# Patient Record
Sex: MALE | Race: WHITE | Employment: UNEMPLOYED | URBAN - METROPOLITAN AREA
[De-identification: names, ages, dates, MRNs, and addresses within clinical notes are randomized per-mention and may not be internally consistent; named-entity substitution may affect disease eponyms.]

---

## 2018-05-29 ENCOUNTER — HOSPITAL ENCOUNTER (EMERGENCY)
Age: 48
Discharge: HOME | End: 2018-05-29
Attending: EMERGENCY MEDICINE
Payer: MEDICAID

## 2018-05-29 VITALS
HEART RATE: 95 BPM | SYSTOLIC BLOOD PRESSURE: 148 MMHG | DIASTOLIC BLOOD PRESSURE: 95 MMHG | BODY MASS INDEX: 26.52 KG/M2 | WEIGHT: 165 LBS | TEMPERATURE: 98 F | HEIGHT: 66 IN | RESPIRATION RATE: 16 BRPM | OXYGEN SATURATION: 99 %

## 2018-05-29 DIAGNOSIS — K12.0 APHTHOUS ULCER OF MOUTH: Primary | ICD-10-CM

## 2018-05-29 DIAGNOSIS — R03.0 ELEVATED BLOOD PRESSURE READING: ICD-10-CM

## 2018-05-29 PROCEDURE — 99282 EMERGENCY DEPT VISIT SF MDM: CPT

## 2018-05-29 RX ORDER — TRIAMCINOLONE ACETONIDE 1 MG/G
PASTE DENTAL 2 TIMES DAILY
Qty: 1 TUBE | Refills: 0 | Status: ON HOLD | OUTPATIENT
Start: 2018-05-29 | End: 2019-06-26

## 2018-05-29 RX ORDER — IBUPROFEN 800 MG/1
800 TABLET ORAL
Qty: 20 TAB | Refills: 0 | Status: SHIPPED | OUTPATIENT
Start: 2018-05-29 | End: 2018-06-05
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
## 2018-05-29 NOTE — DISCHARGE INSTRUCTIONS
Elevated Blood Pressure: Care Instructions  Your Care Instructions    Blood pressure is a measure of how hard the blood pushes against the walls of your arteries. It's normal for blood pressure to go up and down throughout the day. But if it stays up over time, you have high blood pressure. Two numbers tell you your blood pressure. The first number is the systolic pressure. It shows how hard the blood pushes when your heart is pumping. The second number is the diastolic pressure. It shows how hard the blood pushes between heartbeats, when your heart is relaxed and filling with blood. An ideal blood pressure in adults is less than 120/80 (say \"120 over 80\"). High blood pressure is 140/90 or higher. You have high blood pressure if your top number is 140 or higher or your bottom number is 90 or higher, or both. The main test for high blood pressure is simple, fast, and painless. To diagnose high blood pressure, your doctor will test your blood pressure at different times. After testing your blood pressure, your doctor may ask you to test it again when you are home. If you are diagnosed with high blood pressure, you can work with your doctor to make a long-term plan to manage it. Follow-up care is a key part of your treatment and safety. Be sure to make and go to all appointments, and call your doctor if you are having problems. It's also a good idea to know your test results and keep a list of the medicines you take. How can you care for yourself at home? · Do not smoke. Smoking increases your risk for heart attack and stroke. If you need help quitting, talk to your doctor about stop-smoking programs and medicines. These can increase your chances of quitting for good. · Stay at a healthy weight. · Try to limit how much sodium you eat to less than 2,300 milligrams (mg) a day. Your doctor may ask you to try to eat less than 1,500 mg a day. · Be physically active.  Get at least 30 minutes of exercise on most days of the week. Walking is a good choice. You also may want to do other activities, such as running, swimming, cycling, or playing tennis or team sports. · Avoid or limit alcohol. Talk to your doctor about whether you can drink any alcohol. · Eat plenty of fruits, vegetables, and low-fat dairy products. Eat less saturated and total fats. · Learn how to check your blood pressure at home. When should you call for help? Call your doctor now or seek immediate medical care if:  ? · Your blood pressure is much higher than normal (such as 180/110 or higher). ? · You think high blood pressure is causing symptoms such as:  ¨ Severe headache. ¨ Blurry vision. ? Watch closely for changes in your health, and be sure to contact your doctor if:  ? · You do not get better as expected. Where can you learn more? Go to http://natashaInfaCare Pharmaceuticalviviana.info/. Enter L471 in the search box to learn more about \"Elevated Blood Pressure: Care Instructions. \"  Current as of: September 21, 2016  Content Version: 11.4  © 1688-3484 [employer]. Care instructions adapted under license by [employer] (which disclaims liability or warranty for this information). If you have questions about a medical condition or this instruction, always ask your healthcare professional. [address] any warranty or liability for your use of this information. [employer] Activation    Thank you for requesting access to [employer]. Please follow the instructions below to securely access and download your online medical record. [employer] allows you to send messages to your doctor, view your test results, renew your prescriptions, schedule appointments, and more. How Do I Sign Up? 1. In your internet browser, go to [email or web address]  2. Click on the First Time User? Click Here link in the Sign In box. You will be redirect to the New Member Sign Up page.   3. Enter your [employer] Access Code exactly as it appears below. You will not need to use this code after youve completed the sign-up process. If you do not sign up before the expiration date, you must request a new code. [employer] Access Code: [email or web address]  Expires: 2018 12:47 PM (This is the date your [employer] access code will )    4. Enter the last four digits of your Social Security Number (xxxx) and Date of Birth (mm/dd/yyyy) as indicated and click Submit. You will be taken to the next sign-up page. 5. Create a [employer] ID. This will be your [employer] login ID and cannot be changed, so think of one that is secure and easy to remember. 6. Create a [employer] password. You can change your password at any time. 7. Enter your Password Reset Question and Answer. This can be used at a later time if you forget your password. 8. Enter your e-mail address. You will receive e-mail notification when new information is available in [address]. 9. Click Sign Up. You can now view and download portions of your medical record. 10. Click the Download Summary menu link to download a portable copy of your medical information. Additional Information    If you have questions, please visit the Frequently Asked Questions section of the [employer] website at [email or web address] Remember, [employer] is NOT to be used for urgent needs. For medical emergencies, dial 911. Use the Kenalog twice daily on the tongue lesion. Follow up with your primary care provide for further evaluation and treatment.

## 2019-06-19 ENCOUNTER — APPOINTMENT (OUTPATIENT)
Dept: CT IMAGING | Age: 49
End: 2019-06-19
Attending: PHYSICIAN ASSISTANT
Payer: MEDICAID

## 2019-06-19 ENCOUNTER — HOSPITAL ENCOUNTER (EMERGENCY)
Age: 49
Discharge: HOME | End: 2019-06-19
Attending: EMERGENCY MEDICINE
Payer: MEDICAID

## 2019-06-19 VITALS
BODY MASS INDEX: 27.35 KG/M2 | HEART RATE: 84 BPM | TEMPERATURE: 98 F | SYSTOLIC BLOOD PRESSURE: 149 MMHG | WEIGHT: 191 LBS | RESPIRATION RATE: 16 BRPM | HEIGHT: 70 IN | OXYGEN SATURATION: 98 % | DIASTOLIC BLOOD PRESSURE: 87 MMHG

## 2019-06-19 DIAGNOSIS — S01.81XA FACIAL LACERATION, INITIAL ENCOUNTER: ICD-10-CM
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
## 2019-06-19 DIAGNOSIS — S00.83XA FACIAL HEMATOMA, INITIAL ENCOUNTER: ICD-10-CM

## 2019-06-19 PROCEDURE — 77030031132 HC SUT NYL COVD -A

## 2019-06-19 PROCEDURE — 99282 EMERGENCY DEPT VISIT SF MDM: CPT

## 2019-06-19 PROCEDURE — 75810000293 HC SIMP/SUPERF WND  RPR

## 2019-06-19 PROCEDURE — 74011250636 HC RX REV CODE- 250/636: Performed by: PHYSICIAN ASSISTANT

## 2019-06-19 PROCEDURE — 70450 CT HEAD/BRAIN W/O DYE: CPT

## 2019-06-19 PROCEDURE — 90715 TDAP VACCINE 7 YRS/> IM: CPT | Performed by: PHYSICIAN ASSISTANT

## 2019-06-19 PROCEDURE — 90471 IMMUNIZATION ADMIN: CPT

## 2019-06-19 PROCEDURE — 70486 CT MAXILLOFACIAL W/O DYE: CPT

## 2019-06-19 RX ORDER — OXYCODONE AND ACETAMINOPHEN 5; 325 MG/1; MG/1
TABLET ORAL
COMMUNITY
End: 2019-06-19

## 2019-06-19 RX ORDER — HYDROCODONE BITARTRATE AND ACETAMINOPHEN 5; 325 MG/1; MG/1
1 TABLET ORAL
Qty: 12 TAB | Refills: 0 | Status: SHIPPED | OUTPATIENT
Start: 2019-06-19 | End: 2019-06-22

## 2019-06-19 RX ORDER — IBUPROFEN 200 MG
TABLET ORAL
Status: ON HOLD | COMMUNITY
End: 2019-06-26 | Stop reason: DRUGHIGH

## 2019-06-19 RX ORDER — QUETIAPINE FUMARATE 25 MG/1
TABLET, FILM COATED ORAL
Status: ON HOLD | COMMUNITY
End: 2019-06-26 | Stop reason: DRUGHIGH

## 2019-06-19 RX ADMIN — TETANUS TOXOID, REDUCED DIPHTHERIA TOXOID AND ACELLULAR PERTUSSIS VACCINE, ADSORBED 0.5 ML: 5; 2.5; 8; 8; 2.5 SUSPENSION INTRAMUSCULAR at 17:32

## 2019-06-19 NOTE — DISCHARGE INSTRUCTIONS
Patient Education        Preventing Falls: Care Instructions  Your Care Instructions    Getting around your home safely can be a challenge if you have injuries or health problems that make it easy for you to fall. Loose rugs and furniture in walkways are among the dangers for many older people who have problems walking or who have poor eyesight. People who have conditions such as arthritis, osteoporosis, or dementia also have to be careful not to fall. You can make your home safer with a few simple measures. Follow-up care is a key part of your treatment and safety. Be sure to make and go to all appointments, and call your doctor if you are having problems. It's also a good idea to know your test results and keep a list of the medicines you take. How can you care for yourself at home? Taking care of yourself  · You may get dizzy if you do not drink enough water. To prevent dehydration, drink plenty of fluids, enough so that your urine is light yellow or clear like water. Choose water and other caffeine-free clear liquids. If you have kidney, heart, or liver disease and have to limit fluids, talk with your doctor before you increase the amount of fluids you drink. · Exercise regularly to improve your strength, muscle tone, and balance. Walk if you can. Swimming may be a good choice if you cannot walk easily. · Have your vision and hearing checked each year or any time you notice a change. If you have trouble seeing and hearing, you might not be able to avoid objects and could lose your balance. · Know the side effects of the medicines you take. Ask your doctor or pharmacist whether the medicines you take can affect your balance. Sleeping pills or sedatives can affect your balance. · Limit the amount of alcohol you drink. Alcohol can impair your balance and other senses. · Ask your doctor whether calluses or corns on your feet need to be removed.  If you wear loose-fitting shoes because of calluses or corns, you can lose your balance and fall. · Talk to your doctor if you have numbness in your feet. Preventing falls at home  · Remove raised doorway thresholds, throw rugs, and clutter. Repair loose carpet or raised areas in the floor. · Move furniture and electrical cords to keep them out of walking paths. · Use nonskid floor wax, and wipe up spills right away, especially on ceramic tile floors. · If you use a walker or cane, put rubber tips on it. If you use crutches, clean the bottoms of them regularly with an abrasive pad, such as steel wool. · Keep your house well lit, especially [facility], and outside walkways. Use night-lights in areas such as hallways and bathrooms. Add extra light switches or use remote switches (such as switches that go on or off when you clap your hands) to make it easier to turn lights on if you have to get up during the night. · Install sturdy handrails on stairways. · Move items in your cabinets so that the things you use a lot are on the lower shelves (about waist level). · Keep a cordless phone and a flashlight with new batteries by your bed. If possible, put a phone in each of the main rooms of your house, or carry a cell phone in case you fall and cannot reach a phone. Or, you can wear a device around your neck or wrist. You push a button that sends a signal for help. · Wear low-heeled shoes that fit well and give your feet good support. Use footwear with nonskid soles. Check the heels and soles of your shoes for wear. Repair or replace worn heels or soles. · Do not wear socks without shoes on wood floors. · Walk on the grass when the sidewalks are slippery. If you live in an area that gets snow and ice in the winter, sprinkle salt on slippery steps and sidewalks. Preventing falls in the bath  · Install grab bars and nonskid mats inside and outside your shower or tub and near the toilet and sinks. · Use shower chairs and bath benches.   · Use a hand-held shower head that will allow you to sit while showering. · Get into a tub or shower by putting the weaker leg in first. Get out of a tub or shower with your strong side first.  · Repair loose toilet seats and consider installing a raised toilet seat to make getting on and off the toilet easier. · Keep your bathroom door unlocked while you are in the shower. Where can you learn more? Go to http://natasha-viviana.info/. Enter 0476 [id number] in the search box to learn more about \"Preventing Falls: Care Instructions. \"  Current as of: 2018  Content Version: 11.8  © 2532-0400 [employer]. Care instructions adapted under license by [employer] (which disclaims liability or warranty for this information). If you have questions about a medical condition or this instruction, always ask your healthcare professional. [place] [address] any warranty or liability for your use of this information. Fiix Activation    Thank you for requesting access to [employer]. Please follow the instructions below to securely access and download your online medical record. [employer] allows you to send messages to your doctor, view your test results, renew your prescriptions, schedule appointments, and more. How Do I Sign Up? 1. In your internet browser, go to [email or web address]  2. Click on the First Time User? Click Here link in the Sign In box. You will be redirect to the New Member Sign Up page. 3. Enter your Fiix Access Code exactly as it appears below. You will not need to use this code after youve completed the sign-up process. If you do not sign up before the expiration date, you must request a new code. Fiix Access Code: [email or web address]  Expires: 8/3/2019  4:51 PM (This is the date your [employer] access code will )    4. Enter the last four digits of your Social Security Number (xxxx) and Date of Birth (mm/dd/yyyy) as indicated and click Submit.  You will be taken to the next sign-up page. 5. Create a S[employer] ID. This will be your [employer] login ID and cannot be changed, so think of one that is secure and easy to remember. 6. Create a [employer] password. You can change your password at any time. 7. Enter your Password Reset Question and Answer. This can be used at a later time if you forget your password. 8. Enter your e-mail address. You will receive e-mail notification when new information is available in [address]. 9. Click Sign Up. You can now view and download portions of your medical record. 10. Click the Download Summary menu link to download a portable copy of your medical information. Additional Information    If you have questions, please visit the Frequently Asked Questions section of the [employer] website at [email or web address] Remember, [employer] is NOT to be used for urgent needs. For medical emergencies, dial 911. Follow-up with primary care doctor in 1 week. Return to the ED immediately for any new or worsening symptoms.

## 2019-06-19 NOTE — ED TRIAGE NOTES
Inga Anglican in bathroom this morning with face hitting soap dish. Lac above left eye.  Visual aquity 20/20

## 2019-06-19 NOTE — ED PROVIDER NOTES
EMERGENCY DEPARTMENT HISTORY AND PHYSICAL EXAM    Date: 6/19/2019  Patient Name: Leslie Morris [facility] PADMINI RODRIGUEZ    History of Presenting Illness     Chief Complaint   Patient presents with    Laceration    Fall         History Provided By:patient     Chief Complaint: fall  Duration: this morning  Timing:  acute  Location: above the L eye and eyebrow  Quality: aching  Severity: moderate  Modifying Factors: none  Associated Symptoms: none      Additional History (Context): Mariana Fleischer is a 47 y.o. male with PMH htn and arthritis who presents with c/o a laceration above the L eye after a fall this morning. Pt states he slipped in the bathroom striking his forehead on the soap dispenser of the tub. Denies LOC and neck pain. Unsure of his last tetanus. No other complaints. PCP: Phil Sloan MD    Current Outpatient Medications   Medication Sig Dispense Refill    QUEtiapine (SEROQUEL) 25 mg tablet Take  by mouth.  ibuprofen (MOTRIN IB) 200 mg tablet Take  by mouth.  OTHER Indications: bp med      oxyCODONE-acetaminophen (PERCOCET) 5-325 mg per tablet Take  by mouth every four (4) hours as needed for Pain.  triamcinolone acetonide (KENALOG) 0.1 % dental paste by Dental route two (2) times a day. 1 Tube 0       Past History     Past Medical History:  Past Medical History:   Diagnosis Date    Arthritis     Hypertension     Musculoskeletal disorder        Past Surgical History:  Past Surgical History:   Procedure Laterality Date    HX ORTHOPAEDIC         Family History:  History reviewed. No pertinent family history. Social History:  Social History     Tobacco Use    Smoking status: Never Smoker    Smokeless tobacco: Never Used   Substance Use Topics    Alcohol use: Yes     Alcohol/week: 6.0 oz     Types: 12 Cans of beer per week    Drug use: No       Allergies:  No Known Allergies      Review of Systems   Review of Systems   Constitutional: Negative.   Negative for chills and fever. HENT: Negative. Negative for congestion, ear pain and rhinorrhea. Eyes: Negative. Negative for pain and redness. Respiratory: Negative. Negative for cough, shortness of breath, wheezing and stridor. Cardiovascular: Negative. Negative for chest pain and leg swelling. Gastrointestinal: Negative. Negative for abdominal pain, constipation, diarrhea, nausea and vomiting. Genitourinary: Negative. Negative for dysuria and frequency. Musculoskeletal: Negative. Negative for back pain and neck pain. Skin: Positive for wound. Negative for rash. Neurological: Negative. Negative for dizziness, seizures, syncope and headaches. All other systems reviewed and are negative. All Other Systems Negative  Physical Exam     Vitals:    06/19/19 1655   BP: 149/87   Pulse: 84   Resp: 16   Temp: 98 °F (36.7 °C)   SpO2: 98%   Weight: 86.6 kg (191 lb)   Height: 5' 10\" (1.778 m)     Physical Exam   Constitutional: He is oriented to person, place, and time. He appears well-developed and well-nourished. No distress. HENT:   Head: Normocephalic and atraumatic. Eyes: Pupils are equal, round, and reactive to light. Conjunctivae and EOM are normal. Right eye exhibits no discharge. Left eye exhibits no discharge. No scleral icterus. Neck: Normal range of motion. Neck supple. No midline TTP noted to the posterior cervical spine, ROM intact, no evidence of radiculopathy noted. Cardiovascular: Normal rate, regular rhythm and normal heart sounds. Exam reveals no gallop and no friction rub. No murmur heard. Pulmonary/Chest: Effort normal and breath sounds normal. No stridor. No respiratory distress. He has no wheezes. He has no rales. Musculoskeletal: Normal range of motion. Neurological: He is alert and oriented to person, place, and time. Coordination normal.   Gait is steady. Able to ambulate without difficulty. Skin: Skin is warm and dry. No rash noted. He is not diaphoretic. Large 4 cm laceration noted across the L eyebrow and inferior orbital region, wound extends into the adipose tissue, no retained FB noted, bleeding controlled. Psychiatric: He has a normal mood and affect. His behavior is normal. Thought content normal.   Nursing note and vitals reviewed. Diagnostic Study Results     Labs -   No results found for this or any previous visit (from the past 12 hour(s)). Radiologic Studies -   CT MAXILLOFACIAL WO CONT   Final Result   Impression:      Left-sided frontal/supraorbital soft tissue swelling/hematoma. No definite   intraorbital or intraocular abnormality noted. No definite facial bone or skull   fracture. CT HEAD WO CONT   Final Result   IMPRESSION:      Left frontal/supraorbital scalp edema/hematoma with laceration. No adjacent   skull orbital/fracture seen. No acute intracranial hemorrhage, mass effect, midline shift, or herniation. No   definite CT evidence of acute cortical infarct is seen. Please note that   noncontrast head CT may be normal in early acute infarct. CT Results  (Last 48 hours)               06/19/19 1726  CT MAXILLOFACIAL WO CONT Final result    Impression:  Impression:       Left-sided frontal/supraorbital soft tissue swelling/hematoma. No definite   intraorbital or intraocular abnormality noted. No definite facial bone or skull   fracture. Narrative:  History: Henry Alfredo in bathroom with left periorbital laceration       Technique:       Volumetric acquisition was obtained from the bottom of the mandible through the   top of the orbits. Coronal reconstructions were obtained to better detect the   presence of any transverse fractures. One or more dose reduction techniques were used on this CT: automated exposure   control, adjustment of the mAs and/or kVp according to patient's size, and   iterative reconstruction techniques.  The specific techniques utilized on this CT   exam have been documented in the patient's electronic medical record. Digital Imaging and Communications in Medicine (DICOM) format image data are   available to nonaffiliated external healthcare facilities or entities on a   secure, media free, reciprocally searchable basis with patient authorization for   at least a 12-month period after this study. Findings:       Facial bones:  No evidence of an acute fracture or dislocation is identified. Orbits: There is soft tissue/scalp swelling/hematoma involving the left frontal   and supraorbital region. No intraorbital or intraocular abnormality seen on   either side. Soft tissues: Left-sided soft tissue swelling as described. Retention cyst or polyp seen in the left maxillary antrum floor. 06/19/19 1726  CT HEAD WO CONT Final result    Impression:  IMPRESSION:       Left frontal/supraorbital scalp edema/hematoma with laceration. No adjacent   skull orbital/fracture seen. No acute intracranial hemorrhage, mass effect, midline shift, or herniation. No   definite CT evidence of acute cortical infarct is seen. Please note that   noncontrast head CT may be normal in early acute infarct. Narrative:  EXAM: CT head       INDICATION: Fall with injury, laceration above left eye. COMPARISON: None. TECHNIQUE: Axial CT imaging of the head was performed without intravenous   contrast. Coronal and sagittal reconstructions were obtained. Dose reduction: One or more dose reduction techniques were used on this CT:   automated exposure control, adjustment of the mAs and/or kVp according to   patient's size, and iterative reconstruction techniques. The specific techniques   utilized on this CT exam have been documented in the patient's electronic   medical record.   _______________       FINDINGS:       Study limited by motion artifacts.        BRAIN PARENCHYMA: There is no evidence of acute intracranial hemorrhage, mass   effect, midline shift, or herniation. No definite CT evidence of acute cortical   infarct is seen. The gray-white matter differentiation is within normal limits. Mild atrophy. VENTRICLES/EXTRA-AXIAL SPACES/MENINGES: The ventricles and sulci are normal in   their size and configuration. OSSEOUS STRUCTURES: No fracture is seen. PARANASAL SINUSES/MASTOIDS: Visualized paranasal sinuses and mastoid air cells   are clear. ORBITS: Laceration noted with soft tissue swelling left frontal scalp and   supraorbital region. OTHER: None.         _______________               CXR Results  (Last 48 hours)    None            Medical Decision Making   I am the first provider for this patient. I reviewed the vital signs, available nursing notes, past medical history, past surgical history, family history and social history. Vital Signs-Reviewed the patient's vital signs. Records Reviewed: Tomasa Liu PA-C     Procedures:  Wound Repair  Date/Time: 6/19/2019 6:55 PM  Performed by: PA and studentPreparation: skin prepped with Betadine and sterile field established  Pre-procedure re-eval: Immediately prior to the procedure, the patient was reevaluated and found suitable for the planned procedure and any planned medications. Time out: Immediately prior to the procedure a time out was called to verify the correct patient, procedure, equipment, staff and marking as appropriate. .  Location details: face  Wound length:2.6 - 7.5 cm    Anesthesia:  Local Anesthetic: lidocaine 1% without epinephrine  Anesthetic total: 4 mL  Wound fascia closure material used: 3-0 nylon. Number of sutures: 9  Technique: simple and interrupted  Approximation: close  Patient tolerance: Patient tolerated the procedure well with no immediate complications  My total time at bedside, performing this procedure was 16-30 minutes. Provider Notes (Medical Decision Making):  Impression:  Fall, laceration, hematoma    Wound sutured, tetanus updated, CT scans negative, will d/c with PCP follow-up. Pt agrees. Tomasa Liu PA-C 6:57 PM     MED RECONCILIATION:  No current facility-administered medications for this encounter. Current Outpatient Medications   Medication Sig    QUEtiapine (SEROQUEL) 25 mg tablet Take  by mouth.  ibuprofen (MOTRIN IB) 200 mg tablet Take  by mouth.  OTHER Indications: bp med    oxyCODONE-acetaminophen (PERCOCET) 5-325 mg per tablet Take  by mouth every four (4) hours as needed for Pain.  triamcinolone acetonide (KENALOG) 0.1 % dental paste by Dental route two (2) times a day. Disposition:  D/c    DISCHARGE NOTE:   Patient is stable for discharge at this time. No new rx given. Rest and follow-up with PCP this week. Return to the ED immediately for any new or worsening sx. Tomasa Chan PA-C 6:57 PM     Follow-up Information     Follow up With Specialties Details Why Contact Info    Mpoy Lebron Holstein, MD Internal Medicine Schedule an appointment as soon as possible for a visit in 1 week For suture removal [address] [place] [address] EMERGENCY DEPT Emergency Medicine  As needed, If symptoms worsen [address] [place] [address]  [phone]            Diagnosis     Clinical Impression:   1. Fall, initial encounter    2. Facial laceration, initial encounter    3.  Facial hematoma, initial encounter

## 2019-06-26 ENCOUNTER — HOSPITAL ENCOUNTER (OUTPATIENT)
Age: 49
Setting detail: OBSERVATION
Discharge: HOME | DRG: 460 | End: 2019-06-27
Attending: EMERGENCY MEDICINE | Admitting: HOSPITALIST
Payer: MEDICAID

## 2019-06-26 ENCOUNTER — APPOINTMENT (OUTPATIENT)
Dept: GENERAL RADIOLOGY | Age: 49
DRG: 460 | End: 2019-06-26
Attending: EMERGENCY MEDICINE
Payer: MEDICAID

## 2019-06-26 DIAGNOSIS — I95.9 TRANSIENT HYPOTENSION: ICD-10-CM

## 2019-06-26 DIAGNOSIS — N17.9 AKI (ACUTE KIDNEY INJURY) (HCC): ICD-10-CM

## 2019-06-26 DIAGNOSIS — R42 INTERMITTENT VERTIGO: Primary | ICD-10-CM

## 2019-06-26 DIAGNOSIS — R53.1 GENERAL WEAKNESS: ICD-10-CM

## 2019-06-26 DIAGNOSIS — E86.0 DEHYDRATION: ICD-10-CM

## 2019-06-26 PROBLEM — E83.51 HYPOCALCEMIA: Status: ACTIVE | Noted: 2019-06-26

## 2019-06-26 PROBLEM — I10 HTN (HYPERTENSION): Status: ACTIVE | Noted: 2019-06-26

## 2019-06-26 PROBLEM — M25.50 ARTHRALGIA: Status: ACTIVE | Noted: 2019-06-26

## 2019-06-26 PROBLEM — S22.39XA CLOSED RIB FRACTURE: Status: ACTIVE | Noted: 2019-06-26
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
## 2019-06-26 LAB
ALBUMIN SERPL-MCNC: 3.8 G/DL (ref 3.4–5)
ALBUMIN/GLOB SERPL: 1 {RATIO} (ref 0.8–1.7)
ALP SERPL-CCNC: 102 U/L (ref 45–117)
ALT SERPL-CCNC: 29 U/L (ref 16–61)
ANION GAP BLD CALC-SCNC: 20 MMOL/L (ref 10–20)
ANION GAP SERPL CALC-SCNC: 16 MMOL/L (ref 3–18)
APPEARANCE UR: CLEAR
AST SERPL-CCNC: 24 U/L (ref 15–37)
ATRIAL RATE: 86 BPM
BACTERIA URNS QL MICRO: ABNORMAL /HPF
BASOPHILS # BLD: 0 K/UL (ref 0–0.1)
BASOPHILS NFR BLD: 0 % (ref 0–2)
BILIRUB SERPL-MCNC: 0.3 MG/DL (ref 0.2–1)
BILIRUB UR QL: NEGATIVE
BUN BLD-MCNC: 39 MG/DL (ref 7–18)
BUN SERPL-MCNC: 45 MG/DL (ref 7–18)
BUN/CREAT SERPL: 12 (ref 12–20)
CA-I BLD-MCNC: 1.08 MMOL/L (ref 1.12–1.32)
CALCIUM SERPL-MCNC: 8.7 MG/DL (ref 8.5–10.1)
CALCULATED P AXIS, ECG09: 38 DEGREES
CALCULATED R AXIS, ECG10: 6 DEGREES
CALCULATED T AXIS, ECG11: 5 DEGREES
CHLORIDE BLD-SCNC: 104 MMOL/L (ref 100–108)
CHLORIDE SERPL-SCNC: 99 MMOL/L (ref 100–108)
CK MB CFR SERPL CALC: 1.2 % (ref 0–4)
CK MB SERPL-MCNC: 4.8 NG/ML (ref 5–25)
CK SERPL-CCNC: 393 U/L (ref 39–308)
CO2 BLD-SCNC: 21 MMOL/L (ref 19–24)
CO2 SERPL-SCNC: 24 MMOL/L (ref 21–32)
COLOR UR: YELLOW
CREAT SERPL-MCNC: 3.73 MG/DL (ref 0.6–1.3)
CREAT UR-MCNC: 2.8 MG/DL (ref 0.6–1.3)
DIAGNOSIS, 93000: NORMAL
DIFFERENTIAL METHOD BLD: ABNORMAL
EOSINOPHIL # BLD: 0.2 K/UL (ref 0–0.4)
EOSINOPHIL NFR BLD: 2 % (ref 0–5)
EPITH CASTS URNS QL MICRO: ABNORMAL /LPF (ref 0–5)
ERYTHROCYTE [DISTWIDTH] IN BLOOD BY AUTOMATED COUNT: 15.2 % (ref 11.6–14.5)
GLOBULIN SER CALC-MCNC: 3.7 G/DL (ref 2–4)
GLUCOSE BLD STRIP.AUTO-MCNC: 100 MG/DL (ref 74–106)
GLUCOSE SERPL-MCNC: 100 MG/DL (ref 74–99)
GLUCOSE UR STRIP.AUTO-MCNC: NEGATIVE MG/DL
HCT VFR BLD AUTO: 40.7 % (ref 36–48)
HCT VFR BLD CALC: 34 % (ref 36–49)
HGB BLD-MCNC: 11.6 G/DL (ref 12–16)
HGB BLD-MCNC: 13.3 G/DL (ref 13–16)
HGB UR QL STRIP: NEGATIVE
HYALINE CASTS URNS QL MICRO: ABNORMAL /LPF (ref 0–2)
KETONES UR QL STRIP.AUTO: NEGATIVE MG/DL
LACTATE BLD-SCNC: 1.01 MMOL/L (ref 0.4–2)
LEUKOCYTE ESTERASE UR QL STRIP.AUTO: NEGATIVE
LYMPHOCYTES # BLD: 2.6 K/UL (ref 0.9–3.6)
LYMPHOCYTES NFR BLD: 28 % (ref 21–52)
MAGNESIUM SERPL-MCNC: 2.5 MG/DL (ref 1.6–2.6)
MCH RBC QN AUTO: 26.6 PG (ref 24–34)
MCHC RBC AUTO-ENTMCNC: 32.7 G/DL (ref 31–37)
MCV RBC AUTO: 81.4 FL (ref 74–97)
MONOCYTES # BLD: 1.1 K/UL (ref 0.05–1.2)
MONOCYTES NFR BLD: 12 % (ref 3–10)
MUCOUS THREADS URNS QL MICRO: ABNORMAL /LPF
NEUTS SEG # BLD: 5.4 K/UL (ref 1.8–8)
NEUTS SEG NFR BLD: 58 % (ref 40–73)
NITRITE UR QL STRIP.AUTO: NEGATIVE
P-R INTERVAL, ECG05: 126 MS
PH UR STRIP: 5.5 [PH] (ref 5–8)
PLATELET # BLD AUTO: 300 K/UL (ref 135–420)
PMV BLD AUTO: 8.9 FL (ref 9.2–11.8)
POTASSIUM BLD-SCNC: 3.7 MMOL/L (ref 3.5–5.5)
POTASSIUM SERPL-SCNC: 3.8 MMOL/L (ref 3.5–5.5)
PROT SERPL-MCNC: 7.5 G/DL (ref 6.4–8.2)
PROT UR STRIP-MCNC: 30 MG/DL
Q-T INTERVAL, ECG07: 424 MS
QRS DURATION, ECG06: 84 MS
QTC CALCULATION (BEZET), ECG08: 507 MS
RBC # BLD AUTO: 5 M/UL (ref 4.7–5.5)
RBC #/AREA URNS HPF: ABNORMAL /HPF (ref 0–5)
SODIUM BLD-SCNC: 140 MMOL/L (ref 136–145)
SODIUM SERPL-SCNC: 139 MMOL/L (ref 136–145)
SP GR UR REFRACTOMETRY: 1.02 (ref 1–1.03)
TROPONIN I SERPL-MCNC: 0.02 NG/ML (ref 0–0.04)
TROPONIN I SERPL-MCNC: <0.02 NG/ML (ref 0–0.04)
TROPONIN I SERPL-MCNC: <0.02 NG/ML (ref 0–0.04)
UROBILINOGEN UR QL STRIP.AUTO: 0.2 EU/DL (ref 0.2–1)
VENTRICULAR RATE, ECG03: 86 BPM
WBC # BLD AUTO: 9.3 K/UL (ref 4.6–13.2)
WBC URNS QL MICRO: ABNORMAL /HPF (ref 0–4)

## 2019-06-26 PROCEDURE — 80053 COMPREHEN METABOLIC PANEL: CPT

## 2019-06-26 PROCEDURE — 85025 COMPLETE CBC W/AUTO DIFF WBC: CPT

## 2019-06-26 PROCEDURE — 96372 THER/PROPH/DIAG INJ SC/IM: CPT

## 2019-06-26 PROCEDURE — 87040 BLOOD CULTURE FOR BACTERIA: CPT

## 2019-06-26 PROCEDURE — 83735 ASSAY OF MAGNESIUM: CPT

## 2019-06-26 PROCEDURE — 96360 HYDRATION IV INFUSION INIT: CPT

## 2019-06-26 PROCEDURE — 81001 URINALYSIS AUTO W/SCOPE: CPT

## 2019-06-26 PROCEDURE — 82550 ASSAY OF CK (CPK): CPT

## 2019-06-26 PROCEDURE — 36415 COLL VENOUS BLD VENIPUNCTURE: CPT

## 2019-06-26 PROCEDURE — 99285 EMERGENCY DEPT VISIT HI MDM: CPT

## 2019-06-26 PROCEDURE — 80047 BASIC METABLC PNL IONIZED CA: CPT

## 2019-06-26 PROCEDURE — 74011250636 HC RX REV CODE- 250/636: Performed by: INTERNAL MEDICINE

## 2019-06-26 PROCEDURE — 71045 X-RAY EXAM CHEST 1 VIEW: CPT

## 2019-06-26 PROCEDURE — 74011000258 HC RX REV CODE- 258: Performed by: INTERNAL MEDICINE

## 2019-06-26 PROCEDURE — 83605 ASSAY OF LACTIC ACID: CPT

## 2019-06-26 PROCEDURE — 96361 HYDRATE IV INFUSION ADD-ON: CPT

## 2019-06-26 PROCEDURE — 65660000000 HC RM CCU STEPDOWN

## 2019-06-26 PROCEDURE — 97162 PT EVAL MOD COMPLEX 30 MIN: CPT

## 2019-06-26 PROCEDURE — 74011250637 HC RX REV CODE- 250/637: Performed by: EMERGENCY MEDICINE

## 2019-06-26 PROCEDURE — 99218 HC RM OBSERVATION: CPT

## 2019-06-26 PROCEDURE — 93005 ELECTROCARDIOGRAM TRACING: CPT

## 2019-06-26 PROCEDURE — 74011250636 HC RX REV CODE- 250/636: Performed by: EMERGENCY MEDICINE

## 2019-06-26 RX ORDER — ACETAMINOPHEN 500 MG
1000 TABLET ORAL
Status: COMPLETED | OUTPATIENT
Start: 2019-06-26 | End: 2019-06-26

## 2019-06-26 RX ORDER — HEPARIN SODIUM 5000 [USP'U]/ML
5000 INJECTION, SOLUTION INTRAVENOUS; SUBCUTANEOUS EVERY 8 HOURS
Status: DISCONTINUED | OUTPATIENT
Start: 2019-06-26 | End: 2019-06-27 | Stop reason: HOSPADM

## 2019-06-26 RX ORDER — HYDROCODONE BITARTRATE AND ACETAMINOPHEN 5; 325 MG/1; MG/1
1 TABLET ORAL
Status: DISCONTINUED | OUTPATIENT
Start: 2019-06-26 | End: 2019-06-27 | Stop reason: HOSPADM

## 2019-06-26 RX ORDER — LISINOPRIL 20 MG/1
20 TABLET ORAL DAILY
COMMUNITY

## 2019-06-26 RX ORDER — ASPIRIN 325 MG/1
50 TABLET, FILM COATED ORAL DAILY
Status: DISCONTINUED | OUTPATIENT
Start: 2019-06-27 | End: 2019-06-27 | Stop reason: HOSPADM

## 2019-06-26 RX ORDER — QUETIAPINE FUMARATE 200 MG/1
200 TABLET, FILM COATED ORAL
COMMUNITY

## 2019-06-26 RX ORDER — SODIUM CHLORIDE 0.9 % (FLUSH) 0.9 %
5-10 SYRINGE (ML) INJECTION AS NEEDED
Status: DISCONTINUED | OUTPATIENT
Start: 2019-06-26 | End: 2019-06-27 | Stop reason: HOSPADM

## 2019-06-26 RX ORDER — THERA TABS 400 MCG
1 TAB ORAL
Status: COMPLETED | OUTPATIENT
Start: 2019-06-26 | End: 2019-06-26

## 2019-06-26 RX ORDER — SODIUM CHLORIDE 450 MG/100ML
150 INJECTION, SOLUTION INTRAVENOUS CONTINUOUS
Status: DISCONTINUED | OUTPATIENT
Start: 2019-06-26 | End: 2019-06-27 | Stop reason: HOSPADM

## 2019-06-26 RX ORDER — ACETAMINOPHEN 325 MG/1
650 TABLET ORAL
Status: DISCONTINUED | OUTPATIENT
Start: 2019-06-26 | End: 2019-06-27 | Stop reason: HOSPADM

## 2019-06-26 RX ORDER — QUETIAPINE FUMARATE 25 MG/1
25 TABLET, FILM COATED ORAL
Status: DISCONTINUED | OUTPATIENT
Start: 2019-06-26 | End: 2019-06-27 | Stop reason: HOSPADM

## 2019-06-26 RX ORDER — MECLIZINE HCL 12.5 MG 12.5 MG/1
50 TABLET ORAL
Status: COMPLETED | OUTPATIENT
Start: 2019-06-26 | End: 2019-06-26

## 2019-06-26 RX ORDER — TRAMADOL HYDROCHLORIDE 50 MG/1
50-100 TABLET ORAL
COMMUNITY

## 2019-06-26 RX ORDER — IBUPROFEN 800 MG/1
800 TABLET ORAL
COMMUNITY
End: 2019-06-27

## 2019-06-26 RX ORDER — OXYCODONE AND ACETAMINOPHEN 5; 325 MG/1; MG/1
1 TABLET ORAL
COMMUNITY

## 2019-06-26 RX ADMIN — SODIUM CHLORIDE 150 ML/HR: 450 INJECTION, SOLUTION INTRAVENOUS at 12:40

## 2019-06-26 RX ADMIN — SODIUM CHLORIDE 598 ML: 900 INJECTION, SOLUTION INTRAVENOUS at 07:45

## 2019-06-26 RX ADMIN — ACETAMINOPHEN 1000 MG: 500 TABLET, FILM COATED ORAL at 08:01

## 2019-06-26 RX ADMIN — HEPARIN SODIUM 5000 UNITS: 5000 INJECTION, SOLUTION INTRAVENOUS; SUBCUTANEOUS at 20:21

## 2019-06-26 RX ADMIN — SODIUM CHLORIDE 1000 ML: 900 INJECTION, SOLUTION INTRAVENOUS at 07:45

## 2019-06-26 RX ADMIN — THERA TABS 1 TABLET: TAB at 08:01

## 2019-06-26 RX ADMIN — SODIUM CHLORIDE 1000 ML: 900 INJECTION, SOLUTION INTRAVENOUS at 10:02

## 2019-06-26 RX ADMIN — MECLIZINE 50 MG: 12.5 TABLET ORAL at 08:01

## 2019-06-26 NOTE — PROGRESS NOTES
TRANSFER - IN REPORT:    Verbal report received from Yovanny(name) on [facility] [employer]  being received from ED(unit) for routine progression of care      Report consisted of patients Situation, Background, Assessment and   Recommendations(SBAR). Information from the following report(s) SBAR, Kardex, Intake/Output, MAR and Recent Results was reviewed with the receiving nurse. Opportunity for questions and clarification was provided. Assessment completed upon patients arrival to unit and care assumed.

## 2019-06-26 NOTE — PROGRESS NOTES
Received patient from [facility]. Pt asleep in bed. Bed locked in lowest position. Frequently used items and call light within reach. 2120; Pt noted to have two pill bottles in his pocket one label read QUEtipine (Seroquel) and the read Seldanafil. Pt verbalized he had just taken the Seroquel, so I held his scheduled Seroquel. Pt advised to submit these personal medicines to pharmacy for proper holding and administration to ensure his safety. Pt refused even after several attempts were made by me to educate him on the risks of double dosing or interactions especially if nursing staff is not aware of what he is taking or when he took them. Pt verbalized understanding but insisted on keeping his meds on him. 9167: Charge nurse Bruce Leon RN and and Nursing supervisor Payal Estes RN made aware. Payal Estes advised this RN to document this encounter and inform security. 2132: Dr. Onofre Dk informed about this incident as documented above. He advised to follow the protocol by informing the charge nurse and nursing supervisor. Done     2138: Called security and informed security staff of the incident. Raissa Wright at bedside, retrieved med from patient. Handed them over to this RN. Will submit to pharmacy for storage. 2250: Delegated to SHAILA Rojo to transport meds to pharmacy. Phamacy notified.

## 2019-06-26 NOTE — H&P
Internal Medicine History and Physical  Note           NAME:  Awilda Adame [facility] ANNEL [facility]   :   1970   MRN:  [id number]     PCP:  Yessenia Caballero MD     Date/Time:  2019 10:16 AM      I hereby certify this patient for admission based upon medical necessity as noted below:        Assessment / plan :        #   Acute renal failure (ARF) ([facility] [address] (2019). Combination of pre renal due to dehydration , NSAID use,  HCTZ ( he showed me orange small pill for BP, I think its hctz), he is on viagra prn. Use serequel to sleep  - aggressive hydration. Urine lytes and other per orders. Monitor labs. Avoid nephrotoxins. Renally dosing medications. Monitor urine out put.   - US if no improvement     #  Hypotension (2019). Spend time in hot weather working on the yard yesterday , also see above. Hydration. Follow. No BP stable    #   HTN (hypertension) (2019). Control BP     #  Arthralgia (2019). Bad R hip pains told he need replacement. Been using many ibuprofen as he run out of percocet from his PCP     #  Hypocalcemia (2019). Mild. Follow      #  Closed rib fracture (2019). Deny CP or other chest complain. # Falls (2019). [place] in the shower recently, slipped , resulted in facial injury above L eye , sutures removed in ER     #  Dizziness (2019). Likely due to postural hypotension as he feel dizzy once get up to do things. PT/OT. Orthostatic vs    # ho Alcohol dependence , currently report 3 beers occasionaly. Could contribute to his falls. Monitor for withdrawal. Vitamin       -DVT prophylaxis :  HEPARIN.   - Code Status : FULL    -Other chronic medical problems as per past history. Further management depend on the course of the case and expanded data base.   DISPO - pt to be admitted at this time for reasons addressed above, continued hospitalization for ongoing assessment and treatment indicated        Subjective: CHIEF COMPLAINT: FALLS     HISTORY OF PRESENT ILLNESS:     Mr. Franki Simms is a 47 y.o.  male who is admitted for ARF. Mr. Franki Simms presented to the Emergency Department today  complaining of Fall. He report dizziness and not feeling well , started after working in the yard out side in the heat of the sun yesterday. He feel dizzy when get up. He fell once walked after yard work. He had falls before and one time in the shower. Use lots of ibuprofen for hip pain, he relates his falls to bad hip as well. Use serequel to sleep. BP in ER in 802 improved after IVF. Past Medical History:   Diagnosis Date    Arthritis     Hypertension     Musculoskeletal disorder         Past Surgical History:   Procedure Laterality Date    HX ORTHOPAEDIC         Social History     Tobacco Use    Smoking status: Never Smoker    Smokeless tobacco: Never Used   Substance Use Topics    Alcohol use: Yes     Alcohol/week: 6.0 oz     Types: 12 Cans of beer per week        History reviewed. No pertinent family history. No Known Allergies     Prior to Admission medications    Medication Sig Start Date End Date Taking? Authorizing Provider   QUEtiapine (SEROQUEL) 25 mg tablet Take  by mouth. Tanya Phoenix MD   ibuprofen (MOTRIN IB) 200 mg tablet Take  by mouth. Alban, MD Tanya   OTHER Indications: bp med    Tanya Phoenix MD   triamcinolone acetonide (KENALOG) 0.1 % dental paste by Dental route two (2) times a day.  5/29/18   Ben Norris NP       Review of Systems:  (bold if positive, otherwise negative), apart from what mentioned in HPI  Apart from above patient deny any other significant complain  Gen:  Weight gain, weight loss, fever, chills, fatigue  Eyes:  Visual changes, pain, conjunctivitis  ENT:  Sore throat, rhinorrhea, decreased hearing  CVS:  Palpitations, chest pain, dizziness, syncope, edema, PND  Pulm:  Cough, dyspnea, sputum, hemoptysis, wheezing  GI:  Abdominal pain, nausea, emesis, diarrhea, constipation, GERD, melena  :  Hematuria, incontinence, nocturia, dysuria, discharge  MS:  Pain, weakness, swelling, arthritis  Skin:  Rash, erythema, abscess, wound, moles  Endo:  Heat intolerance, cold intolerance, weight gain, polyuria  Hem:  Enlarged nodes, bruising, bleeding, night sweats  Renal:  Edema, change in urine, flank pain  Neuro:  Numbness, tingling, weakness, seizure, headache, tremors       VITALS:    Vital signs reviewed; most recent are:    Visit Vitals  /64   Pulse 74   Temp 98.8 °F (37.1 °C)   Resp 14   Wt 86.6 kg (191 lb)   SpO2 99%   BMI 27.41 kg/m²     SpO2 Readings from Last 6 Encounters:   06/26/19 99%   06/19/19 98%   05/29/18 99%   01/27/16 100%   09/29/15 96%   09/26/15 98%            Intake/Output Summary (Last 24 hours) at 6/26/2019 1140  Last data filed at 6/26/2019 7053  Gross per 24 hour   Intake 2598 ml   Output 100 ml   Net 2498 ml        Physical Exam:     Gen:  Appear stated age, Well-developed, well-nourished, in no acute distress  HEENT: suture above L eye clean,  normocephalic , hearing intact to voice, dry  mucous membranes. Neck:  Supple, no masses I appreciate, thyroid non-tender. Resp:  No accessory muscle use,Bilateral BS present, clear breath sounds without wheezes rales or rhonchi  Card:  No murmurs, normal S1, S2 without thrills, bruits or peripheral edema. Abd:  Soft, non-tender, non-distended, normoactive bowel sounds are present, no palpable organomegaly   Musc:  No cyanosis or clubbing. Skin:  No rashes or ulcers, skin turgor is good. Neuro:  Cranial nerves are grossly intact, no clear area of focal motor weakness, follows commands appropriately. Psych:   oriented to person, place and time, alert. Labs:     All Cardiac Markers in the last 24 hours:   Lab Results   Component Value Date/Time     (H) 06/26/2019 10:40 AM    CKMB 4.8 (H) 06/26/2019 10:40 AM    CKND1 1.2 06/26/2019 10:40 AM    TROIQ 0.02 06/26/2019 10:40 AM     ABG: No results found for: PH, PHI, PCO2, PCO2I, PO2, PO2I, HCO3, HCO3I, FIO2, FIO2I    Recent Labs     06/26/19  0734   WBC 9.3   HGB 13.3   HCT 40.7        Recent Labs     06/26/19  0734      K 3.8   CL 99*   CO2 24   *   BUN 45*   CREA 3.73*   CA 8.7   MG 2.5   ALB 3.8   TBILI 0.3   SGOT 24   ALT 29     Lab Results   Component Value Date/Time    Glucose,  06/26/2019 09:59 AM     No results for input(s): PH, PCO2, PO2, HCO3, FIO2 in the last 72 hours. No results for input(s): INR in the last 72 hours. No lab exists for component: [employer]    Xr Chest Port    Result Date: 6/26/2019  IMPRESSION: 1. No acute cardiopulmonary disease. 2. Multiple bilateral rib fractures some of which appear to be new compared to prior exam in 2015 and ununited but do not appear acute inferiorly on the right. Please refer to radiology reports. Telemetry reviewed:   normal sinus rhythm    Risk of deterioration: high      Total time spent in the care of this patient: [address] discussed with: Patient, Nursing Staff, >50% of time spent in counseling and coordination of care and ER MD / Staff      Discussed:  Care Plan and D/C Planning       I have personally reviewed all pertinent labs, films and EKGs that have officially resulted. I reviewed available pertaining electronic documentation outlining the initial presentation as well as the emergency room physician's encounter.     Attending Physician: Ernesto Henry MD

## 2019-06-26 NOTE — PROGRESS NOTES
Admission Medication Reconciliation:    Information obtained from: Rx Query, [address] [facility]    Comments/Recommendations: Medication list updated. Please adjust doses as you see fit based on updated list    Significant PMH/Disease States:   Past Medical History:   Diagnosis Date    Arthritis     Hypertension     Musculoskeletal disorder        Allergies:  Patient has no known allergies. Prior to Admission Medications:   Prior to Admission Medications   Prescriptions Last Dose Informant Patient Reported? Taking? QUEtiapine (SEROQUEL) 200 mg tablet   Yes Yes   Sig: Take 200 mg by mouth daily. ibuprofen (MOTRIN) 800 mg tablet   Yes Yes   Sig: Take 800 mg by mouth every eight (8) hours as needed for Pain. lisinopril (PRINIVIL, ZESTRIL) 20 mg tablet   Yes Yes   Sig: Take 20 mg by mouth daily. oxyCODONE-acetaminophen (PERCOCET) 5-325 mg per tablet   Yes Yes   Sig: Take 1 Tab by mouth every eight to twelve (8-12) hours as needed for Pain.   traMADol (ULTRAM) 50 mg tablet   Yes Yes   Sig: Take  mg by mouth every eight (8) hours as needed for Pain. Facility-Administered Medications: None         Please call pharmacy for questions.     Thanks,  Inderjit Chan, PHARMD

## 2019-06-26 NOTE — PROGRESS NOTES
Problem: Mobility Impaired (Adult and Pediatric)  Goal: *Acute Goals and Plan of Care (Insert Text)  Description  Physical Therapy Goals   Initiated 6/26/2019 and to be accomplished within 7 days. 1.  Patient will ambulate 200 feet with modified independence using LRAD in order to prepare for safe negotiation of their environment. 2.  Patient will participate in dynamic standing activity x 10 minutes with good standing balance in order to reduce risk of falls. Outcome: Progressing Towards Goal   PHYSICAL THERAPY EVALUATION    Patient: Porsha Michael (6 y.o. male)  Date: 6/26/2019  Primary Diagnosis: Acute renal failure (ARF) ([facility] [N17.9]        Precautions: Fall       PLOF: Patient independent PTA; lives with family in an apartment with 0 ESTELA. As abnormal gait; patient states that he needs a hip replacement and has a leg length discrepancy. Patient ambulates without AD. ASSESSMENT :  Patient standing at bedside, agreeable to participation with PT. Modified independent for supine > sit; Independent for sit > stand. CGA for ambulation x 35 ft without AD. Patient ambulating with decreased step clearance, impaired swing on R; decreased LEs extension; increased pelvic tilt, altered arm swing, and decreased heel strike. Patient returned to room, left supine in bed with all needs. No c/o pain; denies vertigo symptoms. Patient presenting with deficits in: Gait and Balance    Patient educated on role of PT, PT POC, bed mobility, transfers, gait, and safety with mobility as indicated. Recommend d/c home with outpatient PT. Plan to address gait with AD to improve safety with ambulation at home. Patient will benefit from skilled intervention to address the above impairments. Patient's rehabilitation potential is considered to be Good  Factors which may influence rehabilitation potential include:   ? None noted  ? Mental ability/status  ? Medical condition  ? Home/family situation and support systems  ? Safety awareness  ? Pain tolerance/management  ? Other:      PLAN :  Recommendations and Planned Interventions:   ?           Bed Mobility Training             ? Neuromuscular Re-Education  ? Transfer Training                   ? Orthotic/Prosthetic Training  ? Gait Training                          ? Modalities  ? Therapeutic Exercises           ? Edema Management/Control  ? Therapeutic Activities            ? Family Training/Education  ? Patient Education  ? Other (comment):    Frequency/Duration: Patient will be followed by physical therapy 1-2 times per day/4-7 days per week to address goals. Discharge Recommendations: Outpatient  Further Equipment Recommendations for Discharge: TBD     SUBJECTIVE:   Patient stated I'm hoping they let me leave soon.     OBJECTIVE DATA SUMMARY:     Past Medical History:   Diagnosis Date    Arthritis     Hypertension     Musculoskeletal disorder      Past Surgical History:   Procedure Laterality Date    HX ORTHOPAEDIC       Barriers to Learning/Limitations: None  Compensate with: N/A  Home Situation:  Home Situation  Home Environment: Apartment  One/Two Story Residence: One story  Living Alone: No  Support Systems: Family member(s)  Patient Expects to be Discharged to[de-identified] Apartment  Current DME Used/Available at Home: None  Critical Behavior:  Neurologic State: Alert  Orientation Level: Oriented X4  Cognition: Appropriate for age attention/concentration; Follows commands     Psychosocial  Purposeful Interaction: Yes    Functional Mobility:  Bed Mobility:     Supine to Sit: Modified independent  Sit to Supine: Modified independent     Transfers:  Sit to Stand: Independent  Stand to Sit: Independent    Balance:   Sitting: Intact  Standing: Impaired  Standing - Static: Fair  Standing - Dynamic : Fair  Ambulation/Gait Training:  Distance (ft): 35 Feet (ft)  Assistive Device: (None)  Ambulation - Level of Assistance: Contact guard assistance     Gait Description (WDL): Exceptions to WDL  Gait Abnormalities: Decreased step clearance, impaired swing on R; decreased LEs extension; increased pelvic tilt, altered arm swing, and decreased heel strike    Base of Support: Center of gravity altered  Stance: Left decreased  Speed/Anita: Pace decreased (<100 feet/min)  Step Length: Left shortened;Right shortened  Swing Pattern: Right asymmetrical       Pain:  None  Pain level pre-treatment: 0/10   Pain level post-treatment: 0/10   Pain Intervention(s) : N/A    Activity Tolerance:   Good    Please refer to the flowsheet for vital signs taken during this treatment. After treatment:   ?         Patient left in no apparent distress sitting up in chair  ? Patient left in no apparent distress in bed  ? Call bell left within reach  ? Nursing notified  ? Caregiver present  ? Bed alarm activated  ? SCDs applied    COMMUNICATION/EDUCATION:   ?         Role of Physical Therapy in the acute care setting. ?         Fall prevention education was provided and the patient/caregiver indicated understanding. ? Patient/family have participated as able in goal setting and plan of care. ?         Patient/family agree to work toward stated goals and plan of care. ?         Patient understands intent and goals of therapy, but is neutral about his/her participation. ? Patient is unable to participate in goal setting/plan of care: ongoing with therapy staff. ?         Other:     Thank you for this referral.  Geneva Olivo   Time Calculation: 8 mins      Eval Complexity: History: MEDIUM  Complexity : 1-2 comorbidities / personal factors will impact the outcome/ POC Exam:MEDIUM Complexity : 3 Standardized tests and measures addressing body structure, function, activity limitation and / or participation in recreation  Presentation: MEDIUM Complexity : Evolving with changing characteristics  Clinical Decision Making:Medium Complexity Abnormal gait, CGA for ambulation  Overall Complexity:MEDIUM

## 2019-06-26 NOTE — ED TRIAGE NOTES
Pt arrived to ed via ems for c/o dizziness. Pt states was seen at the ed last week for a fall in the shower, hitting his head and requiring sutures.   Pt states dizziness onset yesterday that has lasted through the night

## 2019-06-26 NOTE — PROGRESS NOTES
OT order received, chart reviewed. Attempted OT evaluation. Pt sleeping soundly, just finished working with PT. Will follow up as schedule permits.     Rene Hernandez MS OTR/L  Office Ext: [phone]

## 2019-06-26 NOTE — ED NOTES
TRANSFER - ED to INPATIENT REPORT:    SBAR report made available to receiving floor on this patient being transferred to [address] [place] [address]  for routine progression of care       Admitting diagnosis Acute renal failure (ARF) ([facility] [N17.9]    Information from the following report(s) SBAR, ED Summary and MAR was made available to receiving floor. Lines:   Peripheral IV 06/26/19 Right Antecubital (Active)   Site Assessment Clean, dry, & intact 6/26/2019  7:35 AM   Phlebitis Assessment 0 6/26/2019  7:35 AM   Infiltration Assessment 0 6/26/2019  7:35 AM   Dressing Status Clean, dry, & intact 6/26/2019  7:35 AM   Dressing Type Transparent 6/26/2019  7:35 AM   Hub Color/Line Status Green;Patent; Flushed 6/26/2019  7:35 AM       Peripheral IV 06/26/19 Right Arm (Active)   Site Assessment Clean, dry, & intact 6/26/2019  7:51 AM   Phlebitis Assessment 0 6/26/2019  7:51 AM   Infiltration Assessment 0 6/26/2019  7:51 AM   Dressing Status Clean, dry, & intact 6/26/2019  7:51 AM   Dressing Type Transparent 6/26/2019  7:51 AM   Hub Color/Line Status Green;Patent; Flushed 6/26/2019  7:51 AM        Patient is oriented to time, place, person and situation   Patient is  continent and ambulatory with assist     Valuables transported with patient       MAP (Monitor): 75 =Monitored (most recent)  Vitals w/ MEWS Score (last day)     Date/Time MEWS Score Pulse Resp Temp BP Level of Consciousness SpO2    06/26/19 11:46:05  1  75  16  98.2 °F (36.8 °C)  118/63  Alert  98 %    06/26/19 1130    75  16    118/63    99 %    06/26/19 1115    74  14    118/64    99 %    06/26/19 1045    75  17    113/68    98 %    06/26/19 1030    76  13    113/58    99 %    06/26/19 1015    77  14    105/53    98 %    06/26/19 1000    78  15    115/84    99 %    06/26/19 0945    77  11    100/71    99 %    06/26/19 0930    76  15    113/59    97 %    06/26/19 0915    78  15    107/61    98 %    06/26/19 0900    79  14    118/66    97 % 06/26/19 0845    79  17    102/53    98 %    06/26/19 0830    79  16    106/53    99 %    06/26/19 0815    81  16    92/50    98 %    06/26/19 0740    84      97/50    99 %    06/26/19 0732          91/55        06/26/19 0730    89  20    82/51  (Abnormal)     96 %    06/26/19 0727    88  18  98.8 °F (37.1 °C)    Alert  98 %              Septic Patients:     Lactic Acid  Lab Results   Component Value Date    [facility] 1.01 06/26/2019    (Most recent on top)  Repeat drawn: NO Time:      ALL LACTIC ACIDS GREATER THAN 2 MUST BE REPEATED POC WITHIN 4 HOURS OR PRIOR TO ADMISSION               Total Fluid Bolus initiated and documented on MAR: YES    All ordered antibiotics initiated within first 3 hours of TIME ZERO?   N/a

## 2019-06-26 NOTE — PROGRESS NOTES
Problem: Falls - Risk of  Goal: *Absence of Falls  Description  Document Devere Jackson Fall Risk and appropriate interventions in the flowsheet.   Outcome: Progressing Towards Goal     Problem: Patient Education: Go to Patient Education Activity  Goal: Patient/Family Education  Outcome: Progressing Towards Goal

## 2019-06-26 NOTE — ED NOTES
Pt transported to 3011 for admission. Pt received by Patricia Clay. Pt in no distress at time of admission.

## 2019-06-26 NOTE — ED PROVIDER NOTES
EMERGENCY DEPARTMENT HISTORY AND PHYSICAL EXAM      Date: 6/26/2019  Patient Name: Celina RODRIGUEZ    History of Presenting Illness     Chief Complaint   Patient presents with    Dizziness       History Provided By: Patient      HPI/Chief Complaint: (Context):who presents with 1 day of intermittent vertigo symptoms not constant  Patient's is only when he moves around or gets up does he gets room spinning. No lightheadedness no chest pain no shortness of breath no abdominal pain no palpitations no focal arm or leg weakness. Patient's symptoms have been like this since yesterday were bad last night as well. He did want to go help someone today at work. 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 But he was not feeling any better and decided to come in and get checked out. There is no vomiting no diarrhea no chest pain no exertional symptoms no focal neurological deficits no acute trauma. Patient states he does have chronic right hip pain he is working with the orthopedic surgeon to alleviate that problem. No cough congestion runny nose  No dysuria or black or bloody stool  Patient symptoms are intermittent positional  Mild to moderate at times  Worsened by getting up. No acute pain and there is no radiation of it.     ---------  Patient's triage note is reviewed  Patient's vitals are stable except blood pressure is 91/55 fluids are being given in the emergency department  Patient's chief complaint is dizziness  Patient with a fall last week and was seen for it.   Patient's allergies are none  Patient's home medication include ibuprofen Seroquel  Past medical history includes hypertension and musculoskeletal disorder  Surgical history is for orthopedic surgery  Social history is no smoking and alcohol use is present  Patient's history is reviewed  Laceration multiple musculoskeletal visits in the past.  ----------------------    PCP: [facility] Deajaun Stroud MD    Current Facility-Administered Medications   Medication Dose Route Frequency Provider Last Rate Last Dose    sodium chloride (NS) flush 5-10 mL  5-10 mL IntraVENous PRN Yannick Hardy MD        sodium chloride 0.9 % bolus infusion 1,000 mL  1,000 mL IntraVENous ONCE Yannick Hardy MD 1,000 mL/hr at 06/26/19 1002 1,000 mL at 06/26/19 1002     Current Outpatient Medications   Medication Sig Dispense Refill    QUEtiapine (SEROQUEL) 25 mg tablet Take  by mouth.  ibuprofen (MOTRIN IB) 200 mg tablet Take  by mouth.  OTHER Indications: bp med      triamcinolone acetonide (KENALOG) 0.1 % dental paste by Dental route two (2) times a day. 1 Tube 0       Past History     Past Medical History:  Past Medical History:   Diagnosis Date    Arthritis     Hypertension     Musculoskeletal disorder        Past Surgical History:  Past Surgical History:   Procedure Laterality Date    HX ORTHOPAEDIC         Family History:  History reviewed. No pertinent family history. Social History:  Social History     Tobacco Use    Smoking status: Never Smoker    Smokeless tobacco: Never Used   Substance Use Topics    Alcohol use: Yes     Alcohol/week: 6.0 oz     Types: 12 Cans of beer per week    Drug use: No       Allergies:  No Known Allergies      Review of Systems   Review of Systems   Constitutional: Negative for activity change, fatigue and fever. HENT: Negative for congestion and rhinorrhea. Eyes: Negative for visual disturbance. Respiratory: Negative for shortness of breath. Cardiovascular: Negative for chest pain and palpitations. Gastrointestinal: Negative for abdominal pain, diarrhea, nausea and vomiting. Genitourinary: Negative for dysuria and hematuria. Musculoskeletal: Negative for back pain. Skin: Negative for rash. Neurological: Positive for dizziness. Negative for weakness and light-headedness. Psychiatric/Behavioral: Negative for agitation.    All other systems reviewed and are negative. Physical Exam     Physical Exam   Constitutional: He appears well-developed and well-nourished. HENT:   Head: Normocephalic and atraumatic. Well-healing left forehead surgical scar. No sign of cellulitis no erythema stitches are in place. Eyes: Pupils are equal, round, and reactive to light. Conjunctivae are normal.   Neck: Normal range of motion. Neck supple. Cardiovascular: Normal rate and regular rhythm. Pulmonary/Chest: Effort normal and breath sounds normal.   Abdominal: Soft. Bowel sounds are normal.   Musculoskeletal: Normal range of motion. Lymphadenopathy:     He has no cervical adenopathy. Neurological: He is alert. Skin: Skin is warm. Psychiatric: He has a normal mood and affect. Medical Decision Making   I am the first provider for this patient. I reviewed the vital signs, available nursing notes, past medical history, past surgical history, family history and social history. Provider Notes (Medical Decision Making): Dehydration, acute kidney injury, lightheadedness, vertiginous symptoms, hypotension transiently, this is new findings. Patient had a fall last week  Patient's sutures are removed as they are well-healed and 8 days out. I have given patient aggressive hydration although I am unable to find the cause of patient's dehydration patient did improve with his vertiginous symptoms but his kidney function is not improving in the emergency department I will admit the patient for further management  --------  Vital Signs-Reviewed the patient's vital signs. Pulse Oximetry Analysis -98%, room air, normal  Cardiac Monitor:  Rate/Rhythm:88, sinus rhythm    EKG: Interpreted by the EP.   Time of patient's EKG 7:41 AM, 86, normal sinus rhythm, QTC of 507 ms prolonged, no sign of acute ischemia or dysrhythmia on this EKG       Vitals:    06/26/19 0915 06/26/19 0930 06/26/19 0945 06/26/19 1000   BP: 107/61 113/59 100/71 115/84 Pulse: 78 76 77 78   Resp: 15 15 11 15   Temp:       SpO2: 98% 97% 99% 99%   Weight:           Records Reviewed: Nursing Notes    ED Course:   Patient continues to stay elevated creatinine acute kidney injury is considered  This is a new finding  Patient will need to be admitted for further management although dehydration is a high consideration I am concerned due to initial presentation of hypotension lightheadedness the patient needs further management and evaluation and improving before patient can be discharged home. Patient does not have any signs of urinary retention    CRITICAL CARE NOTE :    10:25 AM      IMPENDING DETERIORATION -Cardiovascular    ASSOCIATED RISK FACTORS - Hypotension    MANAGEMENT- Bedside Assessment    INTERPRETATION -  ECG    INTERVENTIONS - hemodynamic mngmt    CASE REVIEW - Hospitalist    TREATMENT RESPONSE -Improved    PERFORMED BY - Self        NOTES   :      I have spent 30 minutes of critical care time involved in lab review, consultations with specialist, family decision- making, bedside attention and documentation. During this entire length of time I was immediately available to the patient .     Brittnee Oliva MD      Patient's information is discussed with the hospitalist at 10:15 AM, patient accepted to the hospital.  Further management by the hospitalist team.      Diagnostic Study Results     Labs -     Recent Results (from the past 12 hour(s))   METABOLIC PANEL, COMPREHENSIVE    Collection Time: 06/26/19  7:34 AM   Result Value Ref Range    Sodium 139 136 - 145 mmol/L    Potassium 3.8 3.5 - 5.5 mmol/L    Chloride 99 (L) 100 - 108 mmol/L    CO2 24 21 - 32 mmol/L    Anion gap 16 3.0 - 18 mmol/L    Glucose 100 (H) 74 - 99 mg/dL    BUN 45 (H) 7.0 - 18 MG/DL    Creatinine 3.73 (H) 0.6 - 1.3 MG/DL    BUN/Creatinine ratio 12 12 - 20      GFR est AA 21 (L) >60 ml/min/1.73m2    GFR est non-AA 17 (L) >60 ml/min/1.73m2    Calcium 8.7 8.5 - 10.1 MG/DL    Bilirubin, total 0.3 0.2 - 1.0 MG/DL    ALT (SGPT) 29 16 - 61 U/L    AST (SGOT) 24 15 - 37 U/L    Alk. phosphatase 102 45 - 117 U/L    Protein, total 7.5 6.4 - 8.2 g/dL    Albumin 3.8 3.4 - 5.0 g/dL    Globulin 3.7 2.0 - 4.0 g/dL    A-G Ratio 1.0 0.8 - 1.7     CBC WITH AUTOMATED DIFF    Collection Time: 06/26/19  7:34 AM   Result Value Ref Range    WBC 9.3 4.6 - 13.2 K/uL    RBC 5.00 4.70 - 5.50 M/uL    HGB 13.3 13.0 - 16.0 g/dL    HCT 40.7 36.0 - 48.0 %    MCV 81.4 74.0 - 97.0 FL    MCH 26.6 24.0 - 34.0 PG    MCHC 32.7 31.0 - 37.0 g/dL    RDW 15.2 (H) 11.6 - 14.5 %    PLATELET 306 125 - 038 K/uL    MPV 8.9 (L) 9.2 - 11.8 FL    NEUTROPHILS 58 40 - 73 %    LYMPHOCYTES 28 21 - 52 %    MONOCYTES 12 (H) 3 - 10 %    EOSINOPHILS 2 0 - 5 %    BASOPHILS 0 0 - 2 %    ABS. NEUTROPHILS 5.4 1.8 - 8.0 K/UL    ABS. LYMPHOCYTES 2.6 0.9 - 3.6 K/UL    ABS. MONOCYTES 1.1 0.05 - 1.2 K/UL    ABS. EOSINOPHILS 0.2 0.0 - 0.4 K/UL    ABS.  BASOPHILS 0.0 0.0 - 0.1 K/UL    DF AUTOMATED     MAGNESIUM    Collection Time: 06/26/19  7:34 AM   Result Value Ref Range    Magnesium 2.5 1.6 - 2.6 mg/dL   EKG, 12 LEAD, INITIAL    Collection Time: 06/26/19  7:41 AM   Result Value Ref Range    Ventricular Rate 86 BPM    Atrial Rate 86 BPM    P-R Interval 126 ms    QRS Duration 84 ms    Q-T Interval 424 ms    QTC Calculation (Bezet) 507 ms    Calculated P Axis 38 degrees    Calculated R Axis 6 degrees    Calculated T Axis 5 degrees    Diagnosis       Normal sinus rhythm  Possible Left atrial enlargement  Left ventricular hypertrophy  Prolonged QT  Abnormal ECG  When compared with ECG of 25-JAN-2012 06:26,  QT has lengthened     CULTURE, BLOOD    Collection Time: 06/26/19  7:45 AM   Result Value Ref Range    Special Requests: PERIPHERAL      Culture result: NO GROWTH AFTER 1 HOUR     POC LACTIC ACID    Collection Time: 06/26/19  7:55 AM   Result Value Ref Range    Lactic Acid (POC) 1.01 0.40 - 2.00 mmol/L   CULTURE, BLOOD    Collection Time: 06/26/19  7:57 AM   Result Value Ref Range    Special Requests: PERIPHERAL      Culture result: NO GROWTH AFTER 1 HOUR     URINALYSIS W/ RFLX MICROSCOPIC    Collection Time: 06/26/19  8:50 AM   Result Value Ref Range    Color YELLOW      Appearance CLEAR      Specific gravity 1.018 1.005 - 1.030      pH (UA) 5.5 5.0 - 8.0      Protein 30 (A) NEG mg/dL    Glucose NEGATIVE  NEG mg/dL    Ketone NEGATIVE  NEG mg/dL    Bilirubin NEGATIVE  NEG      Blood NEGATIVE  NEG      Urobilinogen 0.2 0.2 - 1.0 EU/dL    Nitrites NEGATIVE  NEG      Leukocyte Esterase NEGATIVE  NEG     URINE MICROSCOPIC ONLY    Collection Time: 06/26/19  8:50 AM   Result Value Ref Range    WBC 0 to 3 0 - 4 /hpf    RBC 0 to 1 0 - 5 /hpf    Epithelial cells 2+ 0 - 5 /lpf    Bacteria 2+ (A) NEG /hpf    Mucus 2+ (A) NEG /lpf    Hyaline cast 4 to 10 0 - 2 /lpf   POC CHEM8    Collection Time: 06/26/19  9:59 AM   Result Value Ref Range    CO2, POC 21 19 - 24 MMOL/L    Glucose,  74 - 106 MG/DL    BUN, POC 39 (H) 7 - 18 MG/DL    Creatinine, POC 2.8 (H) 0.6 - 1.3 MG/DL    GFRAA, POC 29 (L) >60 ml/min/1.73m2    GFRNA, POC 24 (L) >60 ml/min/1.73m2    Sodium,  136 - 145 MMOL/L    Potassium, POC 3.7 3.5 - 5.5 MMOL/L    Calcium, ionized (POC) 1.08 (L) 1.12 - 1.32 mmol/L    Chloride,  100 - 108 MMOL/L    Anion gap, POC 20 10 - 20      Hematocrit, POC 34 (L) 36 - 49 %    Hemoglobin, POC 11.6 (L) 12 - 16 G/DL       Radiologic Studies -   XR CHEST PORT   Final Result   IMPRESSION:         1. No acute cardiopulmonary disease. 2. Multiple bilateral rib fractures some of which appear to be new compared to   prior exam in 2015 and ununited but do not appear acute inferiorly on the right. CT Results  (Last 48 hours)    None        CXR Results  (Last 48 hours)               06/26/19 0800  XR CHEST PORT Final result    Impression:  IMPRESSION:           1. No acute cardiopulmonary disease.    2. Multiple bilateral rib fractures some of which appear to be new compared to   prior exam in 2015 and ununited but do not appear acute inferiorly on the right. Narrative:  EXAM: CHEST RADIOGRAPH, SINGLE VIEW       CLINICAL INDICATION/HISTORY: Dizziness       COMPARISON: Bilateral rib series with PA chest 9/21/2015       TECHNIQUE: Portable frontal view of the chest was obtained.        _______________       FINDINGS:       SUPPORT DEVICES: None. HEART AND MEDIASTINUM: Heart and pulmonary vascularity  are normal for AP   technique. LUNGS AND PLEURAL SPACES: Patient is rotated to the right. Lungs are clear. No   effusion. No pneumothorax. BONY THORAX AND SOFT TISSUES: There are multiple bilateral healed rib fractures. Some fractures inferiorly and laterally in the right appears new compared to   prior exam from 2015 although do not appear acute. Stable deformity right   acromioclavicular joint.       _______________                     Discharge     Clinical Impression:   1. Intermittent vertigo    2. Dehydration    3. General weakness    4. CIPRIANO (acute kidney injury) ([address]    5.  Transient hypotension        Disposition:  Admission to the hospital    It should be noted that I will be the provider of record for this patient  Luisa Wagner MD, RDMS, FACEP    Follow-up Information    None         Current Discharge Medication List

## 2019-06-26 NOTE — PROGRESS NOTES
completed the initial Spiritual Assessment of the patient in bed 3 of the emergency room and offered Pastoral Care support ot patient and family. Patient does not have any Church/cultural needs that will affect patients preferences in health care. Chaplains will continue to follow and will provide pastoral care on an as needed/requested basis.     Saray Miguel  Spiritual Care Department  [phone]

## 2019-06-27 VITALS
HEART RATE: 74 BPM | WEIGHT: 191.9 LBS | SYSTOLIC BLOOD PRESSURE: 180 MMHG | BODY MASS INDEX: 27.53 KG/M2 | DIASTOLIC BLOOD PRESSURE: 90 MMHG | RESPIRATION RATE: 17 BRPM | TEMPERATURE: 97.7 F | OXYGEN SATURATION: 99 %

## 2019-06-27 PROBLEM — N17.9 ACUTE RENAL FAILURE (HCC): Status: ACTIVE | Noted: 2019-06-27

## 2019-06-27 LAB
ANION GAP SERPL CALC-SCNC: 5 MMOL/L (ref 3–18)
BASOPHILS # BLD: 0 K/UL (ref 0–0.1)
BASOPHILS NFR BLD: 0 % (ref 0–2)
BUN SERPL-MCNC: 19 MG/DL (ref 7–18)
BUN/CREAT SERPL: 23 (ref 12–20)
CALCIUM SERPL-MCNC: 7.5 MG/DL (ref 8.5–10.1)
CHLORIDE SERPL-SCNC: 112 MMOL/L (ref 100–108)
CO2 SERPL-SCNC: 24 MMOL/L (ref 21–32)
CREAT SERPL-MCNC: 0.82 MG/DL (ref 0.6–1.3)
DIFFERENTIAL METHOD BLD: ABNORMAL
EOSINOPHIL # BLD: 0.2 K/UL (ref 0–0.4)
EOSINOPHIL NFR BLD: 3 % (ref 0–5)
ERYTHROCYTE [DISTWIDTH] IN BLOOD BY AUTOMATED COUNT: 15.4 % (ref 11.6–14.5)
GLUCOSE SERPL-MCNC: 95 MG/DL (ref 74–99)
HCT VFR BLD AUTO: 35.2 % (ref 36–48)
HGB BLD-MCNC: 10.9 G/DL (ref 13–16)
LYMPHOCYTES # BLD: 2.1 K/UL (ref 0.9–3.6)
LYMPHOCYTES NFR BLD: 34 % (ref 21–52)
MAGNESIUM SERPL-MCNC: 2.1 MG/DL (ref 1.6–2.6)
MCH RBC QN AUTO: 26 PG (ref 24–34)
MCHC RBC AUTO-ENTMCNC: 31 G/DL (ref 31–37)
MCV RBC AUTO: 84 FL (ref 74–97)
MONOCYTES # BLD: 0.7 K/UL (ref 0.05–1.2)
MONOCYTES NFR BLD: 11 % (ref 3–10)
NEUTS SEG # BLD: 3.2 K/UL (ref 1.8–8)
NEUTS SEG NFR BLD: 52 % (ref 40–73)
PHOSPHATE SERPL-MCNC: 2.9 MG/DL (ref 2.5–4.9)
PLATELET # BLD AUTO: 197 K/UL (ref 135–420)
PMV BLD AUTO: 9.3 FL (ref 9.2–11.8)
POTASSIUM SERPL-SCNC: 4.4 MMOL/L (ref 3.5–5.5)
RBC # BLD AUTO: 4.19 M/UL (ref 4.7–5.5)
SODIUM SERPL-SCNC: 141 MMOL/L (ref 136–145)
TROPONIN I SERPL-MCNC: <0.02 NG/ML (ref 0–0.04)
WBC # BLD AUTO: 6.2 K/UL (ref 4.6–13.2)

## 2019-06-27 PROCEDURE — 74011000258 HC RX REV CODE- 258: Performed by: INTERNAL MEDICINE

## 2019-06-27 PROCEDURE — 85025 COMPLETE CBC W/AUTO DIFF WBC: CPT

## 2019-06-27 PROCEDURE — 74011250637 HC RX REV CODE- 250/637: Performed by: INTERNAL MEDICINE

## 2019-06-27 PROCEDURE — 97530 THERAPEUTIC ACTIVITIES: CPT

## 2019-06-27 PROCEDURE — 84484 ASSAY OF TROPONIN QUANT: CPT

## 2019-06-27 PROCEDURE — 84100 ASSAY OF PHOSPHORUS: CPT

## 2019-06-27 PROCEDURE — 80048 BASIC METABOLIC PNL TOTAL CA: CPT

## 2019-06-27 PROCEDURE — 96372 THER/PROPH/DIAG INJ SC/IM: CPT

## 2019-06-27 PROCEDURE — 97116 GAIT TRAINING THERAPY: CPT

## 2019-06-27 PROCEDURE — 99218 HC RM OBSERVATION: CPT

## 2019-06-27 PROCEDURE — 96361 HYDRATE IV INFUSION ADD-ON: CPT

## 2019-06-27 PROCEDURE — 74011250636 HC RX REV CODE- 250/636: Performed by: INTERNAL MEDICINE

## 2019-06-27 PROCEDURE — 97165 OT EVAL LOW COMPLEX 30 MIN: CPT

## 2019-06-27 PROCEDURE — 83735 ASSAY OF MAGNESIUM: CPT

## 2019-06-27 PROCEDURE — 36415 COLL VENOUS BLD VENIPUNCTURE: CPT

## 2019-06-27 RX ADMIN — Medication 50 MG: at 09:10

## 2019-06-27 RX ADMIN — HYDROCODONE BITARTRATE AND ACETAMINOPHEN 1 TABLET: 5; 325 TABLET ORAL at 17:59

## 2019-06-27 RX ADMIN — HEPARIN SODIUM 5000 UNITS: 5000 INJECTION, SOLUTION INTRAVENOUS; SUBCUTANEOUS at 13:30

## 2019-06-27 RX ADMIN — SODIUM CHLORIDE 150 ML/HR: 450 INJECTION, SOLUTION INTRAVENOUS at 13:32

## 2019-06-27 RX ADMIN — SODIUM CHLORIDE 150 ML/HR: 450 INJECTION, SOLUTION INTRAVENOUS at 01:05

## 2019-06-27 RX ADMIN — MULTIPLE VITAMINS W/ MINERALS TAB 1 TABLET: TAB at 09:10

## 2019-06-27 RX ADMIN — HYDROCODONE BITARTRATE AND ACETAMINOPHEN 1 TABLET: 5; 325 TABLET ORAL at 09:15

## 2019-06-27 RX ADMIN — SODIUM CHLORIDE 150 ML/HR: 450 INJECTION, SOLUTION INTRAVENOUS at 07:36

## 2019-06-27 RX ADMIN — HEPARIN SODIUM 5000 UNITS: 5000 INJECTION, SOLUTION INTRAVENOUS; SUBCUTANEOUS at 05:23

## 2019-06-27 NOTE — ROUTINE PROCESS
Bedside and Verbal shift change report given to Ramos Blum RN (oncoming nurse) by Juan Casey RN, BSN (offgoing nurse). Report given with SBAR, Kardex, Intake/Output, MAR and Recent Results.

## 2019-06-27 NOTE — PROGRESS NOTES
Problem: Falls - Risk of  Goal: *Absence of Falls  Description  Document [place] Fall Risk and appropriate interventions in the flowsheet.   Outcome: Progressing Towards Goal     Problem: Patient Education: Go to Patient Education Activity  Goal: Patient/Family Education  Outcome: Progressing Towards Goal     Problem: Patient Education: Go to Patient Education Activity  Goal: Patient/Family Education  Outcome: Progressing Towards Goal
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
## 2019-06-27 NOTE — PROGRESS NOTES
Problem: Falls - Risk of  Goal: *Absence of Falls  Description  Document Herber Gigi Fall Risk and appropriate interventions in the flowsheet.   Outcome: Progressing Towards Goal     Problem: Pain  Goal: *Control of Pain  Outcome: Progressing Towards Goal

## 2019-06-27 NOTE — DISCHARGE SUMMARY
[facility]  Hospitalist Division    Discharge Summary    Patient: Eduard Choi MRN: [id number]  CSN: [id number]    YOB: 1970  Age: 47 y.o. Sex: male    DOA: 6/26/2019 LOS:  LOS: 1 day   Discharge Date:      Admission Diagnoses: Acute renal failure (ARF) ([facility] [N17.9]  Acute renal failure (ARF) ([facility] [N17.9]  Acute renal failure ([facility] [N17.9]    Discharge Diagnoses:    Problem List as of 6/27/2019 Never Reviewed          Codes Class Noted - Resolved    Acute renal failure ([facility] ICD-10-CM: N17.9  ICD-9-CM: 584.9  6/27/2019 - Present        Hypotension ICD-10-CM: I95.9  ICD-9-CM: 458.9  6/26/2019 - Present        HTN (hypertension) ICD-10-CM: I10  ICD-9-CM: 401.9  6/26/2019 - Present        Arthralgia ICD-10-CM: M25.50  ICD-9-CM: 719.40  6/26/2019 - Present        Hypocalcemia ICD-10-CM: E83.51  ICD-9-CM: 275.41  6/26/2019 - Present        Closed rib fracture ICD-10-CM: S22.39XA  ICD-9-CM: 807.00  6/26/2019 - Present        Falls ICD-10-CM: W19. Dolph Bitter  ICD-9-CM: E888.9  6/26/2019 - Present        * (Principal) Acute renal failure (ARF) ([facility] ICD-10-CM: N17.9  ICD-9-CM: 584.9  6/26/2019 - Present        Dizziness ICD-10-CM: R42  ICD-9-CM: 780.4  6/26/2019 - Present        Nasal bone fracture ICD-10-CM: S02. 2XXA  ICD-9-CM: 802.0  11/16/2012 - Present        DJD (degenerative joint disease) of hip ICD-10-CM: M16.9  ICD-9-CM: 715.95  11/16/2012 - Present              Discharge Condition: Stable    Discharge To: Home    Consults: None    Hospital Course:     Mr. Arias Nelson is a 47 y.o.  male who is admitted for ARF. Mr. Arias Nelson presented to the Emergency Department today  complaining of Fall. He report dizziness and not feeling well , started after working in the yard out side in the heat of the sun yesterday. He feel dizzy when get up. He fell once walked after yard work. He had falls before and one time in the shower.  Use lots of ibuprofen for hip pain, he relates his falls to bad hip as well. Use serequel to sleep. BP in ER in 802 improved after IVF. Pt/OT rec outpatient. Creatinine improved after fluid resuscitation. VSS. Patient improved. Encouraged to d/c NSAID usage, ACEi ok to restart. Follow kidney function outpatient. Ready for discharge. Physical Exam:  General appearance: alert, cooperative, no distress, appears stated age  Head: Normocephalic, without obvious abnormality, atraumatic  Lungs: clear to auscultation bilaterally  Heart: regular rate and rhythm, S1, S2 normal, no murmur, click, rub or gallop  Abdomen: soft, non-tender. Bowel sounds normal. No masses,  no organomegaly  Extremities: extremities normal, atraumatic, no cyanosis or edema  Skin: Skin color, texture, turgor normal. No rashes or lesions  Neurologic: Grossly normal  PSY: Mood and affect normal, appropriately behaved    Significant Diagnostic Studies: All lab results for the last 24 hours reviewed. No results found. Discharge Medications:     Current Discharge Medication List      CONTINUE these medications which have NOT CHANGED    Details   QUEtiapine (SEROQUEL) 200 mg tablet Take 200 mg by mouth nightly. oxyCODONE-acetaminophen (PERCOCET) 5-325 mg per tablet Take 1 Tab by mouth every eight to twelve (8-12) hours as needed for Pain. lisinopril (PRINIVIL, ZESTRIL) 20 mg tablet Take 20 mg by mouth daily. traMADol (ULTRAM) 50 mg tablet Take  mg by mouth every eight (8) hours as needed for Pain.          STOP taking these medications       ibuprofen (MOTRIN) 800 mg tablet Comments:   Reason for Stopping:         ibuprofen (MOTRIN IB) 200 mg tablet Comments:   Reason for Stopping:               Activity: Activity as tolerated    Diet: Regular Diet    Wound Care: None needed    Follow-up: PCP    Discharge time: >35 Minutes     RUCHI Rodríguez [address]  Pager: [phone]  Office: [phone]    6/27/2019, 3:45 PM

## 2019-06-27 NOTE — ROUTINE PROCESS
Bedside shift change report given to Rosario [address] (oncoming nurse) by Edilberto Greenberg RN (offgoing nurse). Report included the following information SBAR, Intake/Output and MAR. Opportunity for questions and clarification provided.

## 2019-06-27 NOTE — PROGRESS NOTES
Problem: Mobility Impaired (Adult and Pediatric)  Goal: *Acute Goals and Plan of Care (Insert Text)  Description  Physical Therapy Goals   Initiated 6/26/2019 and to be accomplished within 7 days. 1.  Patient will ambulate 200 feet with modified independence using LRAD in order to prepare for safe negotiation of their environment. 2.  Patient will participate in dynamic standing activity x 10 minutes with good standing balance in order to reduce risk of falls. 6/27/2019 1556 by Sandra Vasquez  Outcome: Resolved/Met  6/27/2019 1556 by Sandra Vasquez  Outcome: Progressing Towards Goal   PHYSICAL THERAPY TREATMENT AND DISCHARGE    Patient: Chavez Ryder (47 y.o. male)  Date: 6/27/2019  Diagnosis: Acute renal failure (ARF) (HCC) [N17.9]  Acute renal failure (ARF) (HCC) [N17.9]  Acute renal failure (HCC) [N17.9] Acute renal failure (ARF) (HCC)       Precautions:    PLOF:     ASSESSMENT:  Independent patient supine in bed, agreeable to participation with PT. Gait training with SPC x 55ft; gait improved with SPC. Patient demo'ing good safety with SPC and verbalizing improved stability with SPC. Returned to bed and left supine. Patient at baseline for mobility. Patient educated on role of PT, PT POC, bed mobility, transfers, gait, and safety with mobility as indicated. PLAN:  Maximum therapeutic gains met at current level of care and patient will be discharged from physical therapy at this time. Rationale for discharge:  ?     Goals Achieved  ? Plateau Reached  ? Patient not participating in therapy  ? Other:  Discharge Recommendations:  Outpatient PT if indicated  Further Equipment Recommendations for Discharge:  N/A     SUBJECTIVE:   Patient stated ? I'm leaving today. ?    OBJECTIVE DATA SUMMARY:   Critical Behavior:  Neurologic State: Alert  Orientation Level: Oriented X4  Cognition: Impulsive, Follows commands  Safety/Judgement: Fall prevention  Functional Mobility Training:  Bed Mobility:  Rolling: Modified independent  Supine to Sit: Independent  Sit to Supine: Independent  Scooting: Modified independent         Transfers:  Sit to Stand: Independent  Stand to Sit: Independent    Balance:  Sitting: Intact  Standing: Intact  Standing - Static: Fair  Standing - Dynamic : Fair   Range Of Motion:   AROM: Within functional limits    Ambulation/Gait Training:  Distance (ft): 50 Feet (ft)     Ambulation - Level of Assistance: Contact guard assistance        Gait Abnormalities: Altered arm swing;Decreased step clearance;Lurching;Trendelenburg        Base of Support: Center of gravity altered  Stance: Left decreased  Speed/Anita: Pace decreased (<100 feet/min)  Step Length: Left shortened;Right shortened  Swing Pattern: Right asymmetrical    Pain:  None  Pain level pre-treatment: 0/10   Pain level post-treatment: 0/10   Pain Intervention(s): N/A    Activity Tolerance:   Good    Please refer to the flowsheet for vital signs taken during this treatment. After treatment:   ? Patient left in no apparent distress sitting up in chair  ? Patient left in no apparent distress in bed  ? Call bell left within reach  ? Nursing notified  ? Caregiver present  ? Bed alarm activated  ? SCDs applied      COMMUNICATION/EDUCATION:   ?         Role of Physical Therapy in the acute care setting. ?         Fall prevention education was provided and the patient/caregiver indicated understanding. ? Patient/family have participated as able and agree with findings and recommendations. ?         Patient is unable to participate in plan of care at this time. ?          Other:        Chaitanya Clay   Time Calculation: 17 mins

## 2019-06-27 NOTE — PROGRESS NOTES
Problem: Discharge Planning  Goal: *Discharge to safe environment  Outcome: Progressing Towards Goal     Reason for Admission:   Acute kidney injury                   RRAT Score:    7                 Plan for utilizing home health:  As indicated                        Current Advanced Directive/Advance Care Plan:  Not on file                         Transition of Care Plan:      Home  Interviewed patient, he agreed to share his discharge information with hid daughter Herb Dowling . Demographic information is incorrect, his correct address is [address], [address] I have sent an  e mail to  Cammie Hoffmann who is covering for correction. He was independent prior to admission and sees Dr Elmer Loyola for his primary care needs. His discharge plan is to return home. Care Management Interventions  PCP Verified by CM: Yes  Palliative Care Criteria Met (RRAT>21 & CHF Dx)?: No  Mode of Transport at Discharge: Other (see comment)  Transition of Care Consult (CM Consult): Discharge Planning  MyChart Signup: No  Discharge Durable Medical Equipment: No  Health Maintenance Reviewed: Yes  Physical Therapy Consult: No  Occupational Therapy Consult: No  Speech Therapy Consult: No  Current Support Network:  Other  Confirm Follow Up Transport: Friends  Plan discussed with Pt/Family/Caregiver: Yes   Resource Information Provided?: No  Discharge Location  Discharge Placement: Home

## 2019-06-27 NOTE — DISCHARGE INSTRUCTIONS
DISCHARGE SUMMARY from Nurse    PATIENT INSTRUCTIONS:    After general anesthesia or intravenous sedation, for 24 hours or while taking prescription Narcotics:  · Limit your activities  · Do not drive and operate hazardous machinery  · Do not make important personal or business decisions  · Do  not drink alcoholic beverages  · If you have not urinated within 8 hours after discharge, please contact your surgeon on call. Report the following to your surgeon:  · Excessive pain, swelling, redness or odor of or around the surgical area  · Temperature over 100.5  · Nausea and vomiting lasting longer than 4 hours or if unable to take medications  · Any signs of decreased circulation or nerve impairment to extremity: change in color, persistent  numbness, tingling, coldness or increase pain  · Any questions    What to do at Home:  * Activity: Activity as tolerated     * Diet: Regular Diet     * Recommended activity: as documented above    * If you experience any of the following symptoms as listed in your teaching for Acute Renal Failure under \"When Should You Call For Help?\", please follow up with your Primary Care Physician and/or call 911. *  Please give a list of your current medications to your Primary Care Provider. *  Please update this list whenever your medications are discontinued, doses are      changed, or new medications (including over-the-counter products) are added. *  Please carry medication information at all times in case of emergency situations. These are general instructions for a healthy lifestyle:    No smoking/ No tobacco products/ Avoid exposure to second hand smoke  Surgeon General's Warning:  Quitting smoking now greatly reduces serious risk to your health.     Obesity, smoking, and sedentary lifestyle greatly increases your risk for illness    A healthy diet, regular physical exercise & weight monitoring are important for maintaining a healthy lifestyle    You may be retaining fluid if you have a history of heart failure or if you experience any of the following symptoms:  Weight gain of 3 pounds or more overnight or 5 pounds in a week, increased swelling in our hands or feet or shortness of breath while lying flat in bed. Please call your doctor as soon as you notice any of these symptoms; do not wait until your next office visit. Patient armband removed and shredded      The discharge information has been reviewed with the patient. The patient verbalized understanding. Discharge medications reviewed with the patient and appropriate educational materials and side effects teaching were provided.   ___________________________________________________________________________________________________________________________________

## 2019-06-28 NOTE — PROGRESS NOTES
Received patient from [facility]. Pt awake and in bed. Alert and oriented x 4. Denies pain and discomfort. Bed locked in lowest position. Frequently used items and call light within reach. Per day shift orders, pt has already been discharged by [facility]. All required paperwork completed per report from [facility]. Pt awaiting ride. 1945: pt verbalized that his daughter is coming to pick him up. Meryl Pérez CNA delegated to accompany pt downstairs to waiting area while this RN finishes up getting report. Pt left unit with Susan LINTON.

## 2019-07-02 LAB
BACTERIA SPEC CULT: NORMAL
BACTERIA SPEC CULT: NORMAL
SERVICE CMNT-IMP: NORMAL
SERVICE CMNT-IMP: NORMAL

## 2021-08-03 PROBLEM — N17.9 ACUTE RENAL FAILURE (ARF) (HCC): Status: RESOLVED | Noted: 2019-06-26 | Resolved: 2021-08-03
